# Patient Record
Sex: FEMALE | Race: WHITE | NOT HISPANIC OR LATINO
[De-identification: names, ages, dates, MRNs, and addresses within clinical notes are randomized per-mention and may not be internally consistent; named-entity substitution may affect disease eponyms.]

---

## 2022-04-04 PROBLEM — Z00.00 ENCOUNTER FOR PREVENTIVE HEALTH EXAMINATION: Status: ACTIVE | Noted: 2022-04-04

## 2022-04-11 ENCOUNTER — APPOINTMENT (OUTPATIENT)
Dept: ORTHOPEDIC SURGERY | Facility: CLINIC | Age: 62
End: 2022-04-11

## 2022-04-12 ENCOUNTER — APPOINTMENT (OUTPATIENT)
Dept: ORTHOPEDIC SURGERY | Facility: CLINIC | Age: 62
End: 2022-04-12

## 2023-12-18 ENCOUNTER — APPOINTMENT (OUTPATIENT)
Dept: ORTHOPEDIC SURGERY | Facility: CLINIC | Age: 63
End: 2023-12-18
Payer: COMMERCIAL

## 2023-12-18 VITALS — HEIGHT: 61 IN | BODY MASS INDEX: 36.06 KG/M2 | WEIGHT: 191 LBS

## 2023-12-18 DIAGNOSIS — E11.9 TYPE 2 DIABETES MELLITUS W/OUT COMPLICATIONS: ICD-10-CM

## 2023-12-18 DIAGNOSIS — S93.601A UNSPECIFIED SPRAIN OF RIGHT FOOT, INITIAL ENCOUNTER: ICD-10-CM

## 2023-12-18 PROCEDURE — 99213 OFFICE O/P EST LOW 20 MIN: CPT

## 2023-12-18 PROCEDURE — L4361: CPT | Mod: RT

## 2023-12-18 NOTE — HISTORY OF PRESENT ILLNESS
[8] : 8 [4] : 4 [Dull/Aching] : dull/aching [Sharp] : sharp [de-identified] : 12/18/2023: 4 weeks right foot  pain  after striking foot on wall.   Went to  for Xrays 12/4/2023 and told no fx and gave ace wrap.  No previous hx of injury.  +dm (a1c 7.1). 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

## 2024-01-15 ENCOUNTER — APPOINTMENT (OUTPATIENT)
Dept: ORTHOPEDIC SURGERY | Facility: CLINIC | Age: 64
End: 2024-01-15
Payer: COMMERCIAL

## 2024-01-15 DIAGNOSIS — S93.601D UNSPECIFIED SPRAIN OF RIGHT FOOT, SUBSEQUENT ENCOUNTER: ICD-10-CM

## 2024-01-15 PROCEDURE — 73630 X-RAY EXAM OF FOOT: CPT | Mod: RT

## 2024-01-15 PROCEDURE — 99212 OFFICE O/P EST SF 10 MIN: CPT | Mod: 25

## 2024-01-15 NOTE — PHYSICAL EXAM
[Right] : right foot and ankle [NL (40)] : plantar flexion 40 degrees [NL 30)] : inversion 30 degrees [NL (20)] : eversion 20 degrees [5___] : eversion 5[unfilled]/5 [2+] : dorsalis pedis pulse: 2+ [Mild] : mild swelling of medial foot [] : negative anterior drawer at ankle

## 2024-01-15 NOTE — HISTORY OF PRESENT ILLNESS
[8] : 8 [4] : 4 [Dull/Aching] : dull/aching [Sharp] : sharp [de-identified] : 12/18/2023: 4 weeks right foot  pain  after striking foot on wall.   Went to  for Xrays 12/4/2023 and told no fx and gave ace wrap.  No previous hx of injury.  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 stopped wearing boot last week. no new injury [FreeTextEntry1] : right foot

## 2024-01-15 NOTE — ASSESSMENT
[FreeTextEntry1] : wbat resume cam boot mri to eval occult fx -- has had 2 negative xrays further plan pending mri ice/elevate nsaids prn

## 2024-01-17 ENCOUNTER — APPOINTMENT (OUTPATIENT)
Dept: MRI IMAGING | Facility: CLINIC | Age: 64
End: 2024-01-17
Payer: COMMERCIAL

## 2024-01-17 PROCEDURE — 73718 MRI LOWER EXTREMITY W/O DYE: CPT | Mod: RT

## 2024-01-22 ENCOUNTER — APPOINTMENT (OUTPATIENT)
Dept: ORTHOPEDIC SURGERY | Facility: CLINIC | Age: 64
End: 2024-01-22
Payer: COMMERCIAL

## 2024-01-22 VITALS — HEIGHT: 61 IN | WEIGHT: 191 LBS | BODY MASS INDEX: 36.06 KG/M2

## 2024-01-22 DIAGNOSIS — S92.244A NONDISPLACED FRACTURE OF MEDIAL CUNEIFORM OF RIGHT FOOT, INITIAL ENCOUNTER FOR CLOSED FRACTURE: ICD-10-CM

## 2024-01-22 PROCEDURE — 99212 OFFICE O/P EST SF 10 MIN: CPT

## 2024-01-22 NOTE — ASSESSMENT
[FreeTextEntry1] : wbat reiterated use of boot ice/elevate nsaids prn rest from activity reeval 2 wks

## 2024-01-22 NOTE — IMAGING
[Right] : right foot [There are no fractures, subluxations or dislocations. No significant abnormalities are seen] : There are no fractures, subluxations or dislocations. No significant abnormalities are seen [Weight -] : weightbearing

## 2024-01-22 NOTE — PHYSICAL EXAM
[Right] : right foot and ankle [Mild] : mild swelling of medial foot [NL (40)] : plantar flexion 40 degrees [NL (20)] : eversion 20 degrees [NL 30)] : inversion 30 degrees [5___] : inversion 5[unfilled]/5 [2+] : dorsalis pedis pulse: 2+ [] : negative anterior drawer at ankle [FreeTextEntry8] : med foot ttp

## 2024-01-22 NOTE — HISTORY OF PRESENT ILLNESS
[8] : 8 [4] : 4 [Dull/Aching] : dull/aching [Sharp] : sharp [de-identified] : 12/18/2023: 4 weeks right foot  pain  after striking foot on wall.   Went to  for Xrays 12/4/2023 and told no fx and gave ace wrap.  No previous hx of injury.  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 stopped wearing boot last week. no new injury  01/22/2024: mri f/up. no change. not using boot [] : Post Surgical Visit: no [FreeTextEntry1] : right foot

## 2024-01-22 NOTE — DATA REVIEWED
[MRI] : MRI [Foot] : foot [Right] : of the right [FreeTextEntry1] : non displaced med cuneiform fx [I reviewed the films/CD and additionally noted] : I reviewed the films/CD and additionally noted

## 2024-02-12 ENCOUNTER — APPOINTMENT (OUTPATIENT)
Dept: ORTHOPEDIC SURGERY | Facility: CLINIC | Age: 64
End: 2024-02-12

## 2024-02-26 ENCOUNTER — APPOINTMENT (OUTPATIENT)
Dept: ORTHOPEDIC SURGERY | Facility: CLINIC | Age: 64
End: 2024-02-26

## 2024-04-09 ENCOUNTER — APPOINTMENT (OUTPATIENT)
Dept: ORTHOPEDIC SURGERY | Facility: CLINIC | Age: 64
End: 2024-04-09
Payer: COMMERCIAL

## 2024-04-09 VITALS — WEIGHT: 191 LBS | HEIGHT: 61 IN | BODY MASS INDEX: 36.06 KG/M2

## 2024-04-09 DIAGNOSIS — M54.50 LOW BACK PAIN, UNSPECIFIED: ICD-10-CM

## 2024-04-09 PROCEDURE — 99214 OFFICE O/P EST MOD 30 MIN: CPT | Mod: 25

## 2024-04-09 PROCEDURE — 99204 OFFICE O/P NEW MOD 45 MIN: CPT | Mod: 25

## 2024-04-09 PROCEDURE — 72100 X-RAY EXAM L-S SPINE 2/3 VWS: CPT

## 2024-04-09 PROCEDURE — 73562 X-RAY EXAM OF KNEE 3: CPT | Mod: LT

## 2024-04-09 RX ORDER — MELOXICAM 15 MG/1
15 TABLET ORAL DAILY
Qty: 30 | Refills: 1 | Status: ACTIVE | COMMUNITY
Start: 2024-04-09 | End: 1900-01-01

## 2024-04-09 NOTE — IMAGING
[de-identified] : LEFT KNEE ROM -5-130 lig stable medial joint line tenderness crepitus no effusion 5/5 strength good ROM of the hip  LUMBAR  left lower lumbar tendernes negative SLR  XR LT KNEE showing no sig arthritic changes good alignment XR L-SPINE showing mild scoliosis but good disc space, no sig OA

## 2024-04-09 NOTE — ASSESSMENT
[FreeTextEntry1] : 4/9/24: Pt with LT knee pain x 1 week along with some lumbar pain- one episode of sciatica a few weeks ago. No sig XR findings. Discussed tx options- risks and benefits explained. Will hold off on MRI for now as she has only had pain for 1 week. She is well informed and would like to proceed with Meloxicam and a course of PT for the back/knee. Could have meniscus tear and HNP- possible MRI of back and knee depending on her exam in a month.

## 2024-04-09 NOTE — HISTORY OF PRESENT ILLNESS
[7] : 7 [1] : 2 [Dull/Aching] : dull/aching [Throbbing] : throbbing [Constant] : constant [Leisure] : leisure [Meds] : meds [de-identified] : 4/9/24: 63F with LT knee pain x 1 week and low back pain x 3 weeks with s/s of sciatica. No specific injury/trauma. States she woke up last week and had medial sided pain and felt tight, also had sharp pain that radiated from the back to her knee. States it feels stiff when she walks and feels like it may give out. Pain with stairs. No pain at rest. Took advil with some relief. Ambulates without assistance. Pt is diabetic: last A1c- 7.1.   [] : no [FreeTextEntry1] : left knee  [FreeTextEntry5] : Patient is here for evaluation on the left knee. Pain started a week ago. No injury. no n/t. Pain is medial.   [FreeTextEntry9] : Advil

## 2024-04-13 ENCOUNTER — NON-APPOINTMENT (OUTPATIENT)
Age: 64
End: 2024-04-13

## 2024-04-13 ENCOUNTER — APPOINTMENT (OUTPATIENT)
Dept: ORTHOPEDIC SURGERY | Facility: CLINIC | Age: 64
End: 2024-04-13
Payer: COMMERCIAL

## 2024-04-13 VITALS — WEIGHT: 191 LBS | HEIGHT: 61 IN | BODY MASS INDEX: 36.06 KG/M2

## 2024-04-13 PROCEDURE — 99213 OFFICE O/P EST LOW 20 MIN: CPT

## 2024-04-13 NOTE — PHYSICAL EXAM
[de-identified] : left knee rom 0-130 degrees. no effusion. positive medial joint line tenderness. no lateral joint line tenderness. no instability. positive Mcmurrays.

## 2024-04-13 NOTE — HISTORY OF PRESENT ILLNESS
[10] : 10 [Localized] : localized [Sharp] : sharp [Standing] : standing [Walking] : walking [Stairs] : stairs [de-identified] : This is a 63 year old female with left knee pain. she was seen by  on 4/9/24 and sent for PT and given mobic. she states earlier this week she walked up two flights of stairs which caused her to have severe pain and she states she "cannot walk". she is requesting a mri. [] : Post Surgical Visit: no [FreeTextEntry1] : Left knee  [FreeTextEntry3] : 2 weeks ago  [FreeTextEntry5] : no known injury, pt states she was walking up the steps and she wasn't able to walk after. Was seen by Dr. Meyers on 4/9/2024 [de-identified] : 4/9/2024 [de-identified] : [place]

## 2024-04-13 NOTE — DISCUSSION/SUMMARY
[de-identified] : This is a 63 year old female with left knee possible medial meniscus tear. she will go for a MRI. she is having difficulty walking, so will use a cane for ambulation. note to stay oow provided. follow up with  after the mri. mobic as needed with food.

## 2024-04-16 ENCOUNTER — APPOINTMENT (OUTPATIENT)
Dept: MRI IMAGING | Facility: CLINIC | Age: 64
End: 2024-04-16
Payer: COMMERCIAL

## 2024-04-16 PROCEDURE — 73721 MRI JNT OF LWR EXTRE W/O DYE: CPT | Mod: LT

## 2024-04-22 ENCOUNTER — APPOINTMENT (OUTPATIENT)
Dept: ORTHOPEDIC SURGERY | Facility: CLINIC | Age: 64
End: 2024-04-22
Payer: COMMERCIAL

## 2024-04-22 VITALS — BODY MASS INDEX: 36.06 KG/M2 | WEIGHT: 191 LBS | HEIGHT: 61 IN

## 2024-04-22 DIAGNOSIS — M25.562 PAIN IN LEFT KNEE: ICD-10-CM

## 2024-04-22 PROCEDURE — J3490M: CUSTOM

## 2024-04-22 PROCEDURE — 99213 OFFICE O/P EST LOW 20 MIN: CPT | Mod: 25

## 2024-04-22 PROCEDURE — 20611 DRAIN/INJ JOINT/BURSA W/US: CPT | Mod: LT

## 2024-04-22 NOTE — DISCUSSION/SUMMARY
[de-identified] : The patient was advised of the diagnosis.  The natural history of the pathology was explained in full to the patient in layman's terms. All questions were answered.  The risks and benefits of surgical and non-surgical treatment alternatives were explained in full to the patient.

## 2024-04-22 NOTE — IMAGING
[de-identified] : LEFT KNEE ROM -5-130 lig stable medial joint line tenderness crepitus no effusion 5/5 strength good ROM of the hip Cane

## 2024-04-22 NOTE — PROCEDURE
[FreeTextEntry3] : Large joint injection was performed on the left knee. The indication for this procedure was pain, inflammation, and x-ray evidence of Osteoarthritis on this or prior visit. The site was prepped with betadine, ethyl chloride sprayed topically, and sterile technique used. An injection of Lidocaine 3cc of 1%, Bupivacaine (Marcaine) 5cc of 0.25%, Methylprednisolone (Depomedrol) cc of 80 mg was used. Patient was advised to call if redness, pain, or fever occur, apply ice for 15 minutes out of every hour for the next 12-24 hours as tolerated and patient was advised to rest the joint(s) for days. Patient has tried OTC's including aspirin, Ibuprofen, Aleve, etc. or prescription NSAIDS, and/or exercises at home and/or physical therapy without satisfactory response and patient had decreased mobility in the joint. Ultrasound guidance was indicated for this patient due to better visualize joint space. All ultrasound images have been permanently captured and stored accordingly in our picture.

## 2024-04-22 NOTE — ASSESSMENT
[FreeTextEntry1] : Previous doc: 4/9/24: Pt with LT knee pain x 1 week along with some lumbar pain- one episode of sciatica a few weeks ago. No sig XR findings. Discussed tx options- risks and benefits explained. Will hold off on MRI for now as she has only had pain for 1 week. She is well informed and would like to proceed with Meloxicam and a course of PT for the back/knee. Could have meniscus tear and HNP- possible MRI of back and knee depending on her exam in a month.   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  Small MMT as well - inj today and hinged knee brace.  WBAT.

## 2024-04-30 ENCOUNTER — APPOINTMENT (OUTPATIENT)
Dept: ORTHOPEDIC SURGERY | Facility: CLINIC | Age: 64
End: 2024-04-30

## 2024-05-07 ENCOUNTER — APPOINTMENT (OUTPATIENT)
Dept: ORTHOPEDIC SURGERY | Facility: CLINIC | Age: 64
End: 2024-05-07

## 2024-05-28 ENCOUNTER — APPOINTMENT (OUTPATIENT)
Dept: ORTHOPEDIC SURGERY | Facility: CLINIC | Age: 64
End: 2024-05-28
Payer: COMMERCIAL

## 2024-05-28 VITALS — WEIGHT: 191 LBS | HEIGHT: 61 IN | BODY MASS INDEX: 36.06 KG/M2

## 2024-05-28 DIAGNOSIS — M84.452A PATHOLOGICAL FRACTURE, LEFT FEMUR, INITIAL ENCOUNTER FOR FRACTURE: ICD-10-CM

## 2024-05-28 DIAGNOSIS — S83.241A OTHER TEAR OF MEDIAL MENISCUS, CURRENT INJURY, RIGHT KNEE, INITIAL ENCOUNTER: ICD-10-CM

## 2024-05-28 PROCEDURE — 99213 OFFICE O/P EST LOW 20 MIN: CPT

## 2024-05-28 NOTE — ASSESSMENT
[FreeTextEntry1] : Previous doc: 4/9/24: Pt with LT knee pain x 1 week along with some lumbar pain- one episode of sciatica a few weeks ago. No sig XR findings. Discussed tx options- risks and benefits explained. Will hold off on MRI for now as she has only had pain for 1 week. She is well informed and would like to proceed with Meloxicam and a course of PT for the back/knee. Could have meniscus tear and HNP- possible MRI of back and knee depending on her exam in a month.  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  Small MMT as well - inj today and hinged knee brace.  WBAT.  5/28/24: Much less pain overall but still medial pain on exam - cont PT, may need repeat MRI in July if pain persists.

## 2024-05-28 NOTE — DISCUSSION/SUMMARY
[de-identified] : The patient was advised of the diagnosis.  The natural history of the pathology was explained in full to the patient in layman's terms. All questions were answered.  The risks and benefits of surgical and non-surgical treatment alternatives were explained in full to the patient.

## 2024-05-28 NOTE — IMAGING
[de-identified] : LEFT KNEE ROM -5-130 lig stable medial joint line tenderness medial condyle tenderness crepitus no effusion 5/5 strength good ROM of the hip walks without assistance.

## 2024-05-28 NOTE — HISTORY OF PRESENT ILLNESS
[Dull/Aching] : dull/aching [de-identified] : 5/28/24:  Significant relief from inj and PT.  Stopped using brace.  Previous doc: 4/9/24: 63F with LT knee pain x 1 week and low back pain x 3 weeks with s/s of sciatica. No specific injury/trauma. States she woke up last week and had medial sided pain and felt tight, also had sharp pain that radiated from the back to her knee. States it feels stiff when she walks and feels like it may give out. Pain with stairs. No pain at rest. Took advil with some relief. Ambulates without assistance. Pt is diabetic: last A1c- 7.1.  4/22/24: Had severe worsening pain last week, went to urgent care and had MRI done. [FreeTextEntry5] : has gotten better  [de-identified] : doing PT

## 2024-07-09 ENCOUNTER — APPOINTMENT (OUTPATIENT)
Dept: ORTHOPEDIC SURGERY | Facility: CLINIC | Age: 64
End: 2024-07-09
Payer: COMMERCIAL

## 2024-07-09 VITALS — HEIGHT: 61 IN | WEIGHT: 187 LBS | BODY MASS INDEX: 35.3 KG/M2

## 2024-07-09 DIAGNOSIS — M25.562 PAIN IN LEFT KNEE: ICD-10-CM

## 2024-07-09 DIAGNOSIS — M84.452A PATHOLOGICAL FRACTURE, LEFT FEMUR, INITIAL ENCOUNTER FOR FRACTURE: ICD-10-CM

## 2024-07-09 DIAGNOSIS — S83.241A OTHER TEAR OF MEDIAL MENISCUS, CURRENT INJURY, RIGHT KNEE, INITIAL ENCOUNTER: ICD-10-CM

## 2024-07-09 PROCEDURE — 99213 OFFICE O/P EST LOW 20 MIN: CPT

## 2024-07-11 ENCOUNTER — APPOINTMENT (OUTPATIENT)
Dept: MRI IMAGING | Facility: CLINIC | Age: 64
End: 2024-07-11
Payer: COMMERCIAL

## 2024-07-11 PROCEDURE — 73721 MRI JNT OF LWR EXTRE W/O DYE: CPT | Mod: LT

## 2024-07-30 ENCOUNTER — APPOINTMENT (OUTPATIENT)
Dept: ORTHOPEDIC SURGERY | Facility: CLINIC | Age: 64
End: 2024-07-30
Payer: COMMERCIAL

## 2024-07-30 VITALS — WEIGHT: 187 LBS | HEIGHT: 61 IN | BODY MASS INDEX: 35.3 KG/M2

## 2024-07-30 DIAGNOSIS — S83.241A OTHER TEAR OF MEDIAL MENISCUS, CURRENT INJURY, RIGHT KNEE, INITIAL ENCOUNTER: ICD-10-CM

## 2024-07-30 DIAGNOSIS — M84.452A PATHOLOGICAL FRACTURE, LEFT FEMUR, INITIAL ENCOUNTER FOR FRACTURE: ICD-10-CM

## 2024-07-30 PROCEDURE — 99213 OFFICE O/P EST LOW 20 MIN: CPT

## 2024-07-30 NOTE — IMAGING
[de-identified] : LEFT KNEE ROM -5-130 lig stable medial joint line tenderness medial condyle tenderness crepitus no effusion 5/5 strength good ROM of the hip walks without assistance.

## 2024-07-30 NOTE — HISTORY OF PRESENT ILLNESS
[de-identified] : 7/30/24: F/U MRI left knee, min pain since last visit.  She was able to go on her cruise and dance without difficulty.  Previous doc: 4/9/24: 63F with LT knee pain x 1 week and low back pain x 3 weeks with s/s of sciatica. No specific injury/trauma. States she woke up last week and had medial sided pain and felt tight, also had sharp pain that radiated from the back to her knee. States it feels stiff when she walks and feels like it may give out. Pain with stairs. No pain at rest. Took advil with some relief. Ambulates without assistance. Pt is diabetic: last A1c- 7.1.  4/22/24: Had severe worsening pain last week, went to urgent care and had MRI done. 5/28/24:  Significant relief from inj and PT.  Stopped using brace. 7/9/24: f/up L knee. She is functionally better overall, but his has medial pain

## 2024-07-30 NOTE — DISCUSSION/SUMMARY
[de-identified] : The patient was advised of the diagnosis.  The natural history of the pathology was explained in full to the patient in layman's terms. All questions were answered.  The risks and benefits of surgical and non-surgical treatment alternatives were explained in full to the patient.  I saw the patient under the supervision of Dr. Meyers and followed his plan of care.

## 2024-07-30 NOTE — ASSESSMENT
[FreeTextEntry1] : Previous doc: 4/9/24: Pt with LT knee pain x 1 week along with some lumbar pain- one episode of sciatica a few weeks ago. No sig XR findings. Discussed tx options- risks and benefits explained. Will hold off on MRI for now as she has only had pain for 1 week. She is well informed and would like to proceed with Meloxicam and a course of PT for the back/knee. Could have meniscus tear and HNP- possible MRI of back and knee depending on her exam in a month.  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  Small MMT as well - inj today and hinged knee brace.  WBAT. 5/28/24: Much less pain overall but still medial pain on exam - cont PT, may need repeat MRI in July if pain persists. 7/9/24: Overall doing well, but still very tender over the medial joint line. Will obtain repeat MRI. f/up after imaging   7/30/24: MRI showing MMT and fx healed.  Only medial pain to palpation but no symptoms with activity.  Cont HEP, return prn.  DIscussed inj vs scope if symptoms worsen.